# Patient Record
Sex: MALE | URBAN - METROPOLITAN AREA
[De-identification: names, ages, dates, MRNs, and addresses within clinical notes are randomized per-mention and may not be internally consistent; named-entity substitution may affect disease eponyms.]

---

## 2022-05-19 PROBLEM — H10.13 ALLERGIC CONJUNCTIVITIS, BILATERAL: Status: ACTIVE | Noted: 2022-05-19

## 2022-05-19 PROBLEM — J30.89 ALLERGIC RHINITIS DUE TO DUST MITE: Status: ACTIVE | Noted: 2022-05-19

## 2022-05-19 PROBLEM — J30.1 CHRONIC SEASONAL ALLERGIC RHINITIS DUE TO POLLEN: Status: ACTIVE | Noted: 2022-05-19

## 2022-10-11 PROBLEM — H10.13 ALLERGIC CONJUNCTIVITIS, BILATERAL: Status: RESOLVED | Noted: 2022-05-19 | Resolved: 2022-10-11

## 2023-07-25 ENCOUNTER — TELEPHONE (OUTPATIENT)
Dept: OTHER | Facility: OTHER | Age: 23
End: 2023-07-25

## 2023-09-01 ENCOUNTER — OFFICE VISIT (OUTPATIENT)
Dept: BARIATRICS | Facility: CLINIC | Age: 23
End: 2023-09-01
Payer: COMMERCIAL

## 2023-09-01 VITALS
OXYGEN SATURATION: 96 % | DIASTOLIC BLOOD PRESSURE: 86 MMHG | BODY MASS INDEX: 39.97 KG/M2 | WEIGHT: 301.6 LBS | RESPIRATION RATE: 18 BRPM | HEIGHT: 73 IN | SYSTOLIC BLOOD PRESSURE: 132 MMHG | HEART RATE: 78 BPM

## 2023-09-01 DIAGNOSIS — E66.01 MORBID (SEVERE) OBESITY DUE TO EXCESS CALORIES (HCC): ICD-10-CM

## 2023-09-01 DIAGNOSIS — E66.01 OBESITY, CLASS III, BMI 40-49.9 (MORBID OBESITY) (HCC): ICD-10-CM

## 2023-09-01 DIAGNOSIS — Z01.818 ENCOUNTER FOR OTHER PREPROCEDURAL EXAMINATION: Primary | ICD-10-CM

## 2023-09-01 PROCEDURE — 99204 OFFICE O/P NEW MOD 45 MIN: CPT | Performed by: SURGERY

## 2023-09-01 NOTE — LETTER
September 1, 2023     Adali Bhardwaj, DO  [address]  [id number]    Patient: Saqib Blackwell   YOB: 2000   Date of Visit: 9/1/2023       Dear Dr. Briseyda Arcos: Thank you for referring Saqib Blackwell to me for evaluation. Below are my notes for this consultation. If you have questions, please do not hesitate to call me. I look forward to following your patient along with you. Sincerely,        Dwain Blake MD        CC: No Recipients    Dwain Blake MD  9/1/2023 10:37 AM  Sign when Signing Visit      BARIATRIC INITIAL CONSULT - [facility] 20 y.o. male MRN: [id number]  Unit/Bed#:  Encounter: [id number]      HPI:  Saqib Blackwell is a 20 y.o. male who presents with a longstanding history of morbid obesity and inability to sustain a meaningful weight loss. He is a  in [place]. He desires to pursue metabolic and bariatric surgery to improve his health. Denies GERD. Denies tobacco. Rare NSAIDs. Denies DVT/PE. Here today to discuss bariatric options. Visit type: follow up-routine clinic     Symptoms: inability to loss weight, knee pain and back pain    Associated Symptoms: none    Associated Conditions: none  Disease Complications: none  Weight Loss Interest: high    Exercise Frequency:daily  Types of Exercise: walking      Review of Systems   Musculoskeletal: Positive for arthralgias and back pain. All other systems reviewed and are negative.       Historical Information   Past Medical History:   Diagnosis Date   • Eczema    • Nosebleed      Past Surgical History:   Procedure Laterality Date   • CAUTERIZE INNER NOSE Right 2021     Social History   Social History     Substance and Sexual Activity   Alcohol Use Yes    Comment: socially on weekends     Social History     Substance and Sexual Activity   Drug Use Not on file     Social History     Tobacco Use   Smoking Status Former   • Types: Cigarettes   • Quit date: 12/28/2020   • Years since quittin.6   Smokeless Tobacco Never     Family History: non-contributory    Meds/Allergies   all medications and allergies reviewed  No Known Allergies    Objective       Current Vitals:   /86 (BP Location: Left arm, Patient Position: Sitting, Cuff Size: Adult)   Pulse 78   Resp 18   Ht 6' 0.5" (1.842 m)   Wt (!) 137 kg (301 lb 9.6 oz)   SpO2 96%   BMI 40.34 kg/m²       Invasive Devices       None                   Physical Exam  Constitutional:       Appearance: Normal appearance. HENT:      Head: Atraumatic. Nose: No rhinorrhea. Eyes:      Extraocular Movements: Extraocular movements intact. Cardiovascular:      Rate and Rhythm: Normal rate. Pulmonary:      Effort: Pulmonary effort is normal. No respiratory distress. Abdominal:      General: Abdomen is flat. There is no distension. Musculoskeletal:         General: Normal range of motion. Cervical back: Normal range of motion. Skin:     General: Skin is warm and dry. Neurological:      General: No focal deficit present. Mental Status: He is alert and oriented to person, place, and time. Psychiatric:         Mood and Affect: Mood normal.         Behavior: Behavior normal.          Lab Results: I have personally reviewed pertinent lab results. Imaging: I have personally reviewed pertinent reports. EKG, Pathology, and Other Studies: I have personally reviewed pertinent reports. Assessment/PLAN:    20 y.o. yo female with a long standing h/o of obesity and inability to sustain any meaningful weight loss on her own despite several attempts. She is interested in the Laparoscopic sleeve gastrectomy. Patient has been counseled about the risk of developing gastroesophageal reflux disease (GERD), worsening of current GERD and/or silent reflux. Patient has also been counseled on the risk of developing Leavitt's esophagus (18%).  As a result the patient may require treatment with medications, further interventions and possibly additional surgery. Patient will require routine endoscopic surveillance to monitor for these possible complications. As a part of her pre op evaluation, she will be referred to a cardiologist and for a sleep evaluation and consult after successfully completing an evaluation with our pre-certification/, registered dietician and licensed clinical . She needs an EGD to evaluate the anatomy of her GI tract prior to the operation. I have spent over 45 minutes with her face to face in the office today discussing her options and details of the surgery. Over 50% of this was coordinating care. She was given the opportunity to ask questions and I have answered all of them. I have discussed and educated the patient with regards to the components of our multidisciplinary program and the importance of compliance and follow up in the post operative period. The patient was also instructed with regards to the importance of behavior modification, nutritional counseling, support meeting attendance and lifestyle changes that are important to ensure success. Although there is a great statistical chance of improvement or even resolution of most of her associated comorbidities, the results vary from patient to patient and they largely depend on her commitment and compliance. Weight loss goal will be determined at the time of her evaluation.     Fidelia Walsh MD  9/1/2023  10:31 AM

## 2023-09-01 NOTE — PROGRESS NOTES
BARIATRIC INITIAL CONSULT - BARIATRIC SURGERY    Chandler Jenkins 20 y.o. male MRN: [id number]  Unit/Bed#:  Encounter: [id number]      HPI:  Chandler Jenkins is a 20 y.o. male who presents with a longstanding history of morbid obesity and inability to sustain a meaningful weight loss. He is a  in [place]. He desires to pursue metabolic and bariatric surgery to improve his health. Denies GERD. Denies tobacco. Rare NSAIDs. Denies DVT/PE. Here today to discuss bariatric options. Visit type: follow up-routine clinic     Symptoms: inability to loss weight, knee pain and back pain    Associated Symptoms: none    Associated Conditions: none  Disease Complications: none  Weight Loss Interest: high    Exercise Frequency:daily  Types of Exercise: walking      Review of Systems   Musculoskeletal: Positive for arthralgias and back pain. All other systems reviewed and are negative. Historical Information   Past Medical History:   Diagnosis Date   • Eczema    • Nosebleed      Past Surgical History:   Procedure Laterality Date   • CAUTERIZE INNER NOSE Right      Social History   Social History     Substance and Sexual Activity   Alcohol Use Yes    Comment: socially on weekends     Social History     Substance and Sexual Activity   Drug Use Not on file     Social History     Tobacco Use   Smoking Status Former   • Types: Cigarettes   • Quit date: 2020   • Years since quittin.6   Smokeless Tobacco Never     Family History: non-contributory    Meds/Allergies   all medications and allergies reviewed  No Known Allergies    Objective       Current Vitals:   /86 (BP Location: Left arm, Patient Position: Sitting, Cuff Size: Adult)   Pulse 78   Resp 18   Ht 6' 0.5" (1.842 m)   Wt (!) 137 kg (301 lb 9.6 oz)   SpO2 96%   BMI 40.34 kg/m²       Invasive Devices     None                 Physical Exam  Constitutional:       Appearance: Normal appearance. HENT:      Head: Atraumatic. Nose: No rhinorrhea. Eyes:      Extraocular Movements: Extraocular movements intact. Cardiovascular:      Rate and Rhythm: Normal rate. Pulmonary:      Effort: Pulmonary effort is normal. No respiratory distress. Abdominal:      General: Abdomen is flat. There is no distension. Musculoskeletal:         General: Normal range of motion. Cervical back: Normal range of motion. Skin:     General: Skin is warm and dry. Neurological:      General: No focal deficit present. Mental Status: He is alert and oriented to person, place, and time. Psychiatric:         Mood and Affect: Mood normal.         Behavior: Behavior normal.          Lab Results: I have personally reviewed pertinent lab results. Imaging: I have personally reviewed pertinent reports. EKG, Pathology, and Other Studies: I have personally reviewed pertinent reports. Assessment/PLAN:    20 y.o. yo female with a long standing h/o of obesity and inability to sustain any meaningful weight loss on her own despite several attempts. She is interested in the Laparoscopic sleeve gastrectomy. Patient has been counseled about the risk of developing gastroesophageal reflux disease (GERD), worsening of current GERD and/or silent reflux. Patient has also been counseled on the risk of developing Leavitt's esophagus (18%). As a result the patient may require treatment with medications, further interventions and possibly additional surgery. Patient will require routine endoscopic surveillance to monitor for these possible complications. As a part of her pre op evaluation, she will be referred to a cardiologist and for a sleep evaluation and consult after successfully completing an evaluation with our pre-certification/, registered dietician and licensed clinical . She needs an EGD to evaluate the anatomy of her GI tract prior to the operation.   I have spent over 45 minutes with her face to face in the office today discussing her options and details of the surgery. Over 50% of this was coordinating care. She was given the opportunity to ask questions and I have answered all of them. I have discussed and educated the patient with regards to the components of our multidisciplinary program and the importance of compliance and follow up in the post operative period. The patient was also instructed with regards to the importance of behavior modification, nutritional counseling, support meeting attendance and lifestyle changes that are important to ensure success. Although there is a great statistical chance of improvement or even resolution of most of her associated comorbidities, the results vary from patient to patient and they largely depend on her commitment and compliance. Weight loss goal will be determined at the time of her evaluation.     Samy Hyman MD  9/1/2023  10:31 AM

## 2023-10-05 NOTE — PROGRESS NOTES
Bariatric Behavioral Health Evaluation    Presenting Problem: 20year old male ( 2000) here for behavioral health evaluation. Patient had initial consult with Dr. Julian Mcpherson 23. Patient is wanting a lifestyle change and wants to prevent future health issues. Is the patient seeking Bariatric Surgery Eval? Yes  If yes how long have you researched this surgery option. Patient recently started looking into bariatric surgery after seeing his friend go through the process. Realizes Post- Op Requirements? Yes, but would benefit from more education. Pre-morbid level of function and history of present illness: patient reports struggling with his weight for about 5 years. Patient attributes this to being less active and also a break up with a girlfriend. Psychiatric/Psychological Treatment Diagnosis: Diagnosed with ADHD, prescribed medication by his PCP, but reports that he feels that the medication increases symptoms of anxiety. Patient will reach out to his PCP. Patient educated on the benefits of outpatient therapy to develop positive coping skills and habits for success long term, resource list provided. Outpatient Counselor No     Psychiatrist No     Have you had Inpatient Treatment? No    Family Constellation: Patient currently lives with his dad and his 22year old sister. His mom lives in [place]. Additional comments/stressors related to family/relationships/peer support: Patient identifies his mom and his sister as his support. Patient identifies work as current stressors.     Physical/Psychological Assessment:     Appearance: appropriate  Sociability: friendly  Affect: appropriate  Mood: calm  Thought Process: coherent  Speech: normal  Content: no impairment  Orientation: person  Yes , place  Yes , time  Yes , normal attention span  Yes , normal memory  Yes   and normal judgement  Yes   Insight: emotional  good    Risk Assessment:     none    Recommendations: Recommended for surgery  yes    Risk of Harm to Self or Others: Patient denies SI or HI     Observation:     Interviews: This interview only. Based on the previous information, the client presents the following risk of harm to self or others: low     Note: Patient here for behavioral health evaluation. Diagnosed with ADHD, prescribed medication by his PCP, but reports that he feels that the medication increases symptoms of anxiety. Patient will reach out to his PCP. Patient educated on the benefits of outpatient therapy to develop positive coping skills and habits for success long term, resource list provided. Patient denies any current substance abuse. Former smoker, quit 2.5 years ago. Currently vaping, working on quitting. Patient must quit by 12/10/23 and test by 1/10/23. Alcohol use 2x per week. Patient educated on the impact of nicotine and alcohol on the post bariatric patient. Patient agrees to abstain from all substances prior to as well as after surgery. Patient meets criteria for surgery at this program and will follow up with RD this month. Patient's insurance will require 6 months of weight checks. Patient will also need to schedule an EGD following the visit today- may schedule with GI if he decides to do a colonoscopy at this time as well. Goals discussed:   1. vaping cessation- quit by 12/10 and test by 1/10/24  2. Be mindful not to skip meals  3. Increase water intake  4. increase activity on slower days at work  5. Continue to be mindful of emotional eating  6.  Sleep consult- needs OMER dx to qualify  Bret Hull LCSW

## 2023-10-10 ENCOUNTER — CLINICAL SUPPORT (OUTPATIENT)
Dept: BARIATRICS | Facility: CLINIC | Age: 23
End: 2023-10-10

## 2023-10-10 VITALS
WEIGHT: 296.6 LBS | RESPIRATION RATE: 16 BRPM | HEART RATE: 77 BPM | HEIGHT: 73 IN | SYSTOLIC BLOOD PRESSURE: 148 MMHG | BODY MASS INDEX: 39.31 KG/M2 | DIASTOLIC BLOOD PRESSURE: 88 MMHG

## 2023-10-10 DIAGNOSIS — Z98.84 BARIATRIC SURGERY STATUS: Primary | ICD-10-CM

## 2023-10-10 DIAGNOSIS — E66.9 OBESITY, UNSPECIFIED: Primary | ICD-10-CM

## 2023-10-10 DIAGNOSIS — Z71.89 ENCOUNTER FOR PRE-BARIATRIC SURGERY COUNSELING AND EDUCATION: ICD-10-CM

## 2023-10-10 PROCEDURE — RECHECK

## 2023-10-10 NOTE — PROGRESS NOTES
Bariatric Nutrition Assessment Note - Evaluation    Insurance: 6 required monthly weight checks      Type of surgery    Interested in thrrVertical sleeve gastrectomy  Surgery Date: TBD  Surgeon: Consult with Dr. Bushra Rush 9/1/2023      Nutrition Assessment   Stone Hyde  20 y.o.  male     Resp 16   Ht 6' 0.5" (1.842 m)   Wt 135 kg (296 lb 9.6 oz)   BMI 39.67 kg/m²      Height: 6'0.5"  Eval Weight: 296.6#   BMI: 39.7  Wt with BMI of 25: 187#  Pre-Op Excess Wt: 109.4#  BMI to Qualify at 40 = 299#  PMH includes: Obesity, SB=4/8     Pt advised not to gain weight during preop process. Pt encouraged to lose weight via healthy eating and exercise. Pt may follow Liver Shrinking diet 2 weeks prior to DOS depending on BMI at time. This diet will promote weight loss.     [address] Equation:   Estimated calories to maintain weightt: 2900   Estimated calories for weight loss 5337-0996 ( 1-2# per wk wt loss - sedentary )  Estimated protein needs  grams (1.0-1.5 gms/kg BMI at 25 )   Estimated fluid needs 85-99 oz (30-35 ml/kg BMI at 25 )      Weight History Reason for WLS: AT current weight X 5 years and taking toll on knees and feet - cannot snowboard and ice skate Onset of Obesity: Adult  Mom had bypass  Family history of obesity: Yes  Wt Loss Attempts: Exercise  Fasting  High Protein/Low CHO diets (Keto)  Self Created Diets (Portion Control, Healthy Food Choices, etc.)  Patient has tried the above for 6 months or more with insufficient weight loss or weight regain, which is why patient has requested to be evaluated for weight loss surgery today  Maximum Wt Lost: 25-30#       Review of History and Medications   OTC: Gummies   Past Medical History:   Diagnosis Date   • Eczema    • Nosebleed      Past Surgical History:   Procedure Laterality Date   • CAUTERIZE INNER NOSE Right 2021     Social History     Socioeconomic History   • Marital status: Single     Spouse name: None   • Number of children: None   • Years of education: None   • Highest education level: None   Occupational History   • None   Tobacco Use   • Smoking status: Former     Types: Cigarettes     Quit date: 2020     Years since quittin.7   • Smokeless tobacco: Never   Vaping Use   • Vaping Use: Every day   • Substances: Nicotine   Substance and Sexual Activity   • Alcohol use: Yes     Comment: socially on weekends   • Drug use: None   • Sexual activity: None   Other Topics Concern   • None   Social History Narrative    Who lives in your home: father    What type of home do you live in: Single house    Age of your home: 48 yrs    How long have you been living there: 13 yrs    Type of heat: Baseboard and Wood stove    Type of fuel: Oil and Wood    What type of gabe is in your bedroom: Hardwood floor    Do you have the following in or near your home:    Air products: Window air conditioning    Pests: None    Pets: dogs and cats when he visits his moms but not in his home     Are pets allowed in bedroom: N/A    Open fields, wooded areas nearby: Open fields and wooded areas     Basement: Damp    Exposure to second hand smoke: No        Habits:    Caffein:     Chocolate:      Other:          Social Determinants of Health     Financial Resource Strain: Not on file   Food Insecurity: Not on file   Transportation Needs: Not on file   Physical Activity: Not on file   Stress: Not on file   Social Connections: Not on file   Intimate Partner Violence: Not on file   Housing Stability: Not on file       Current Outpatient Medications:   •  amphetamine-dextroamphetamine (ADDERALL XR) 20 MG 24 hr capsule, Take 20 mg by mouth daily, Disp: , Rfl:   •  montelukast (SINGULAIR) 10 mg tablet, Take 1 tablet (10 mg total) by mouth daily at bedtime, Disp: 30 tablet, Rfl: 5  Food Intake and Lifestyle Assessment  [facility]  Food Intake Assessment completed via usual diet recall  Breakfast: Coffee (rarely) 9:30 SW, Water   Dinner: 6-8 pm  Fatty meats, stews, vegetables, reduced portion   Snacks protein bar, maybe chips  Beverage intake:  Water, Zero sugar beverages Alcohol on W/E - few beers  Protein supplement: Bars - Nilke  Portions: 6 oz Protein  1/2-1 c Starch   1-2c Vegetable    Estimated protein intake per day:   Estimated fluid intake per day: 6-8 bottles in summer  4-6 bottles per day  Meals eaten away from home: Decreased Fast   Typical meal pattern: 2 meals per day and 0-1 snacks per day  Eating Behaviors: Consumption of high calorie/ high fat foods, Large portion sizes and Emotional eating but since developed positive coping skilss  Food allergies or intolerances: No Known Allergies - Lactose intolerance  Cultural or Presybeterian considerations: None    Physical Assessment  Physical Activity Job is very active  Types of exercise: Plans to return to   Current physical limitations: pain    Psychosocial Assessment   Support systems: parent(s) friend(s)  Socioeconomic factors:  in [facility]    Nutrition Diagnosis  Diagnosis: Overweight / Obesity (NC-3.3)  Related to: Physical inactivity and Excessive energy intake  As Evidenced by: BMI >25     Nutrition Prescription: Recommend the following diet  Low fat, Low sugar, High protein and Regular    Interventions and Teaching   Discussed pre-op and post-op nutrition guidelines. Patient educated and handouts provided.   Surgical changes to stomach / GI  Capacity of post-surgery stomach  Diet progression  Adequate hydration  Sugar and fat restriction to decrease "dumping syndrome"  Fat restriction to decrease steatorrhea  Expected weight loss  Weight loss plateaus/ possibility of weight regain  Exercise  Suggestions for pre-op diet  Nutrition considerations after surgery  Protein supplements  Meal planning and preparation  Appropriate carbohydrate, protein, and fat intake, and food/fluid choices to maximize safe weight loss, nutrient intake, and tolerance   Dietary and lifestyle changes  Possible problems with poor eating habits  Intuitive eating  Techniques for self monitoring and keeping daily food journal  Potential for food intolerance after surgery, and ways to deal with them including: lactose intolerance, nausea, reflux, vomiting, diarrhea, food intolerance, appetite changes, gas  Vitamin / Mineral supplementation of Multivitamin with minerals, Calcium, Vitamin B12, Iron, Fat Soluble vitamins and Vitamin D    Patient is not currently pregnant and doesn't desire to become pregnant a minimum of one year post-op    Education provided to: patient    Barriers to learning: No barriers identified    Readiness to change: preparation    Prior research on procedure: internet, discussed with provider and friends or family    Comprehension: verbalizes understanding     Expected Compliance: good  Recommendations  Pt is an appropriate candidate for surgery.  Yes    Evaluation / Monitoring  Dietitian to Monitor: Eating pattern as discussed Tolerance of nutrition prescription Body weight Lab values Physical activity Bowel pattern    Goals  Eliminate sugar sweetened beverages, Food journal, Exercise 30 minutes 5 times per week, Complete lession plans 1-6, Eat 3 meals per day and Eliminate mindless snacking   Follow Pre-Surgery guidelines  > Trial Baritastic for food logging  > Establish regular meal pattern - include fruits, vegetables and whole grains  > Avoid skipping meals- Can use protein drink or bar as meal replacement  > Decrease portions  > Focus on protein - include lean protein at each meal and snack - Learn to eat protein first  > Continue to imit processed foods, fast foods and dining away from home  > Include meal prepping  > Limit snacks - healthier choices and portion; avoid grazing  > Slow pace of eating and sip fluids  - practice 30/60 minute rule  > Reduce caffeine/ eliminate by day of surgery  > Reduce/eliminate carbonation by pre-op diet  > Maintain water intake >  - 64 oz  > Maintain ncrease physical activity -return to gym   > Start multi vitamin and additional Vitamin D 2000IU  Work on skills to cope with emotional eating/mindfull eating  Pre-op weight loss not required but advised not to gain weight  F/U next month with bariatric provider      Time Spent:   1 Hour

## 2023-10-19 ENCOUNTER — OFFICE VISIT (OUTPATIENT)
Dept: BARIATRICS | Facility: CLINIC | Age: 23
End: 2023-10-19

## 2023-10-19 VITALS — WEIGHT: 302.8 LBS | BODY MASS INDEX: 40.5 KG/M2

## 2023-10-19 DIAGNOSIS — Z01.818 PREOPERATIVE TESTING: ICD-10-CM

## 2023-10-19 DIAGNOSIS — E66.9 OBESITY, CLASS II, BMI 35-39.9: ICD-10-CM

## 2023-10-19 DIAGNOSIS — F17.200 NICOTINE DEPENDENCE: ICD-10-CM

## 2023-10-19 PROCEDURE — RECHECK

## 2023-10-19 NOTE — PROGRESS NOTES
Bariatric Nutrition Assessment Note -    Insurance: 6 required monthly weight checks 2/6 today      Type of surgery    Interested in thrrVertical sleeve gastrectomy  Surgery Date: TBD  Surgeon: Consult with Dr. Natanael Matamoros 9/1/2023      Nutrition Assessment   Stone Hyde  20 y.o.  male     There were no vitals taken for this visit. Height: 6'0.5"  Weight: 302.8#  Eval Weight: 296.6#   BMI: 39.7  Wt with BMI of 25: 187#  Pre-Op Excess Wt: 109.4#  BMI to Qualify at 40 = 299#  PMH includes: Obesity, SB=4/8     Pt advised not to gain weight during preop process. Pt encouraged to lose weight via healthy eating and exercise. Pt may follow Liver Shrinking diet 2 weeks prior to DOS depending on BMI at time. This diet will promote weight loss.     [address] [facility] Equation:   Estimated calories to maintain weightt: 2900   Estimated calories for weight loss 1538-2737 ( 1-2# per wk wt loss - sedentary )  Estimated protein needs  grams (1.0-1.5 gms/kg BMI at 25 )   Estimated fluid needs 85-99 oz (30-35 ml/kg BMI at 25 )      Weight History Reason for WLS: AT current weight X 5 years and taking toll on knees and feet - cannot snowboard and ice skate Onset of Obesity: Adult  Mom had bypass  Family history of obesity: Yes  Wt Loss Attempts: Exercise  Fasting  High Protein/Low CHO diets (Keto)  Self Created Diets (Portion Control, Healthy Food Choices, etc.)  Patient has tried the above for 6 months or more with insufficient weight loss or weight regain, which is why patient has requested to be evaluated for weight loss surgery today  Maximum Wt Lost: 25-30#       Review of History and Medications   OTC: Gummies   Past Medical History:   Diagnosis Date    Eczema     Nosebleed      Past Surgical History:   Procedure Laterality Date    CAUTERIZE INNER NOSE Right 2021     Social History     Socioeconomic History    Marital status: Single     Spouse name: Not on file    Number of children: Not on file    Years of education: Not on file    Highest education level: Not on file   Occupational History    Not on file   Tobacco Use    Smoking status: Former     Types: Cigarettes     Quit date: 2020     Years since quittin.8    Smokeless tobacco: Never   Vaping Use    Vaping Use: Every day    Substances: Nicotine   Substance and Sexual Activity    Alcohol use: Yes     Comment: socially on weekends    Drug use: Not on file    Sexual activity: Not on file   Other Topics Concern    Not on file   Social History Narrative    Who lives in your home: father    What type of home do you live in: Single house    Age of your home: 48 yrs    How long have you been living there: 13 yrs    Type of heat: Baseboard and Wood stove    Type of fuel: Oil and Wood    What type of gabe is in your bedroom: Hardwood floor    Do you have the following in or near your home:    Air products: Window air conditioning    Pests: None    Pets: dogs and cats when he visits his moms but not in his home     Are pets allowed in bedroom: N/A    Open fields, wooded areas nearby: Open fields and wooded areas     Basement: Damp    Exposure to second hand smoke: No        Habits:    Caffein:     Chocolate:      Other:          Social Determinants of Health     Financial Resource Strain: Not on file   Food Insecurity: Not on file   Transportation Needs: Not on file   Physical Activity: Not on file   Stress: Not on file   Social Connections: Not on file   Intimate Partner Violence: Not on file   Housing Stability: Not on file       Current Outpatient Medications:     amphetamine-dextroamphetamine (ADDERALL XR) 20 MG 24 hr capsule, Take 20 mg by mouth daily, Disp: , Rfl:     montelukast (SINGULAIR) 10 mg tablet, Take 1 tablet (10 mg total) by mouth daily at bedtime, Disp: 30 tablet, Rfl: 5  Food Intake and Lifestyle Assessment  [facility]  Food Intake Assessment completed via usual diet recall  Breakfast: Coffee (rarely) 9:30 SW, Water   Dinner: 6-8 pm  Fatty meats, stews, vegetables, reduced portion   Snacks protein bar, maybe chips  Beverage intake:  Water, Zero sugar beverages Alcohol on W/E - few beers  Protein supplement: Bars - Nilke  Portions: 6 oz Protein  1/2-1 c Starch   1-2c Vegetable    Estimated protein intake per day:   Estimated fluid intake per day: 6-8 bottles in summer  4-6 bottles per day  Meals eaten away from home: Decreased Fast   Typical meal pattern: 2 meals per day and 0-1 snacks per day  Eating Behaviors: Consumption of high calorie/ high fat foods, Large portion sizes and Emotional eating but since developed positive coping skilss  Food allergies or intolerances: No Known Allergies - Lactose intolerance  Cultural or Sabianism considerations: None    Physical Assessment  Physical Activity Job is very active  Types of exercise: Plans to return to   Current physical limitations: pain    Psychosocial Assessment   Support systems: parent(s) friend(s)  Socioeconomic factors:  in [facility]    Nutrition Diagnosis  Diagnosis: Overweight / Obesity (NC-3.3)  Related to: Physical inactivity and Excessive energy intake  As Evidenced by: BMI >25     Nutrition Prescription: Recommend the following diet  Low fat, Low sugar, High protein and Regular    Interventions and Teaching   Discussed pre-op and post-op nutrition guidelines. Patient educated and handouts provided.   Surgical changes to stomach / GI  Capacity of post-surgery stomach  Diet progression  Adequate hydration  Sugar and fat restriction to decrease "dumping syndrome"  Fat restriction to decrease steatorrhea  Expected weight loss  Weight loss plateaus/ possibility of weight regain  Exercise  Suggestions for pre-op diet  Nutrition considerations after surgery  Protein supplements  Meal planning and preparation  Appropriate carbohydrate, protein, and fat intake, and food/fluid choices to maximize safe weight loss, nutrient intake, and tolerance   Dietary and lifestyle changes  Possible problems with poor eating habits  Intuitive eating  Techniques for self monitoring and keeping daily food journal  Potential for food intolerance after surgery, and ways to deal with them including: lactose intolerance, nausea, reflux, vomiting, diarrhea, food intolerance, appetite changes, gas  Vitamin / Mineral supplementation of Multivitamin with minerals, Calcium, Vitamin B12, Iron, Fat Soluble vitamins and Vitamin D    Patient is not currently pregnant and doesn't desire to become pregnant a minimum of one year post-op    Education provided to: patient    Barriers to learning: No barriers identified    Readiness to change: preparation    Prior research on procedure: internet, discussed with provider and friends or family    Comprehension: verbalizes understanding     Expected Compliance: good  Recommendations  Pt is an appropriate candidate for surgery. Yes    Evaluation / Monitoring  Dietitian to Monitor: Eating pattern as discussed Tolerance of nutrition prescription Body weight Lab values Physical activity Bowel pattern    2/6 Weight Check Visit Summary 10/19/2023  Just started preop process. Pt working on requirements for program. Has stopped vaping since eval last week. Since has been eating sunflower seeds to help replace habit. Also to start using pre prepped meals "Factor" as a healthy option for meals as works long hours with travel. Questions/concerns addressed during session. Workflow reviewed and updated,  To to call to schedule November appt as trying to schedule other appts as per work schedule. [facility]  Psych and/or D+A Clearance: N/A  Blood Work: To complete  PCP letter: To obtain  Surgeon Appt: 9/1/2023  EGD: To schedule with colonoscopy  Cardiac Risk Assessment with ECG To schedule  Sleep Studies:  To scheduled  Nicotine test:  Stopped vaping   Pre-Operative Program: 2/6       Goals  Eliminate sugar sweetened beverages, Food journal, Exercise 30 minutes 5 times per week, Complete lession plans 1-6, Eat 3 meals per day and Eliminate mindless snacking   Follow Pre-Surgery guidelines  > Trial Baritastic for food logging  > Establish regular meal pattern - include fruits, vegetables and whole grains  > Avoid skipping meals- Can use protein drink or bar as meal replacement  > Decrease portions  > Focus on protein - include lean protein at each meal and snack - Learn to eat protein first  > Continue to imit processed foods, fast foods and dining away from home  > Include meal prepping  > Limit snacks - healthier choices and portion; avoid grazing  > Slow pace of eating and sip fluids  - practice 30/60 minute rule  > Reduce caffeine/ eliminate by day of surgery  > Reduce/eliminate carbonation by pre-op diet  > Maintain water intake >  - 64 oz  > Maintain ncrease physical activity -return to gym   > Start multi vitamin and additional Vitamin D 2000IU  Work on skills to cope with emotional eating/mindfull eating  Pre-op weight loss not required but advised not to gain weight  F/U next month with bariatric provider      Time Spent:   30 minutes

## 2023-11-21 NOTE — PROGRESS NOTES
3/6 weight check. Has been making healthier choices. Has access to a microwave at work and has been prepping meals for the week on the weekends. Still skipping lunch lunch. Stopped vaping about 2 weeks ago. Drinking 3-4 bottles of water, 20 oz zero sugar iced tea. Trauma history includes his grandfather passing away in 2013- he passed suddenly of colon cancer within a month of being diagnosed. Workflow reviewed:   Psych and/or D+A Clearance: N/A  **PCP Letter: Needs- plans to schedule physical  Support Group: No longer required, strongly encouraged  Surgeon Appt: 9/1/23  **EGD: Schedule with GI with colonoscopy  **Cardiac Risk Assessment: consult scheduled 3/4/24- move to January  **Sleep Studies: consult completed 11/22/23, sleep study scheduled 12/29  **Blood work: Needs to complete  **Nicotine test: vape- needs to quit by 12/10/23 and test by 1/10/24  Required weight checks: 3/6 today  Weight Loss: Not required, encouraged positive lifestyle changes.   Asher Diaz LCSW

## 2023-11-22 ENCOUNTER — OFFICE VISIT (OUTPATIENT)
Dept: BARIATRICS | Facility: CLINIC | Age: 23
End: 2023-11-22

## 2023-11-22 ENCOUNTER — OFFICE VISIT (OUTPATIENT)
Dept: SLEEP CENTER | Facility: CLINIC | Age: 23
End: 2023-11-22
Payer: COMMERCIAL

## 2023-11-22 VITALS
BODY MASS INDEX: 41.31 KG/M2 | HEART RATE: 78 BPM | SYSTOLIC BLOOD PRESSURE: 132 MMHG | OXYGEN SATURATION: 98 % | HEIGHT: 72 IN | RESPIRATION RATE: 16 BRPM | WEIGHT: 305 LBS | DIASTOLIC BLOOD PRESSURE: 80 MMHG

## 2023-11-22 VITALS — BODY MASS INDEX: 40.96 KG/M2 | WEIGHT: 302 LBS

## 2023-11-22 DIAGNOSIS — E66.01 MORBID OBESITY (HCC): ICD-10-CM

## 2023-11-22 DIAGNOSIS — F90.9 ATTENTION DEFICIT HYPERACTIVITY DISORDER (ADHD), UNSPECIFIED ADHD TYPE: ICD-10-CM

## 2023-11-22 DIAGNOSIS — Z71.89 ENCOUNTER FOR PRE-BARIATRIC SURGERY COUNSELING AND EDUCATION: Primary | ICD-10-CM

## 2023-11-22 DIAGNOSIS — J30.1 CHRONIC SEASONAL ALLERGIC RHINITIS DUE TO POLLEN: ICD-10-CM

## 2023-11-22 DIAGNOSIS — Z98.84 BARIATRIC SURGERY STATUS: ICD-10-CM

## 2023-11-22 DIAGNOSIS — R06.83 SNORING: Primary | ICD-10-CM

## 2023-11-22 PROCEDURE — RECHECK

## 2023-11-22 PROCEDURE — 99204 OFFICE O/P NEW MOD 45 MIN: CPT | Performed by: INTERNAL MEDICINE

## 2023-11-22 NOTE — PATIENT INSTRUCTIONS
What is OMER? Obstructive sleep apnea is a common and serious sleep disorder that causes you to stop breathing during sleep. The airway repeatedly becomes blocked, limiting the amount of air that reaches your lungs. When this happens, you may snore loudly or making choking noises as you try to breathe. Your brain and body becomes oxygen deprived and you may wake up. This may happen a few times a night, or in more severe cases, several hundred times a night. Sleep apnea can make you wake up in the morning feeling tired or unrefreshed even though you have had a full night of sleep. During the day, you may feel fatigued, have difficulty concentrating or you may even unintentionally fall asleep. This is because your body is waking up numerous times throughout the night, even though you might not be conscious of each awakening. The lack of oxygen your body receives can have negative long-term consequences for your health. This includes:  High blood pressure  Heart disease  Irregular heart rhythms  Stroke  Pre-diabetes and diabetes  Depression  Testing  An objective evaluation of your sleep may be needed before your board certified sleep physician can make a diagnosis. Options include:   In-lab overnight sleep study  This type of sleep study requires you to stay overnight at a sleep center, in a bed that may resemble a hotel room. You will sleep with sensors hooked up to various parts of your body. These sensors record your brain waves, heartbeat, breathing and movement. An overnight sleep study also provides your doctor with the most complete information about your sleep. Learn more about an overnight sleep study. Home sleep apnea test  Some patients with high risk factors for obstructive sleep apnea and no other medical disorders may be candidates for a home sleep apnea test. The testing equipment differs in that it is less complicated than what is used in an overnight sleep study.  As such, does not give all the data an in-lab will and if negative, may not mean you do not have the problem. Treatment for sleep apnea includes using a continuous positive airway pressure (CPAP) machine to keep your airway open during sleep. A mask is placed over your nose and mouth, or just your nose. The mask is hooked to the CPAP machine that blows a gentle stream of air into the mask when you breathe. This helps keep your airway open so you can breathe more regularly. Extra oxygen may be given to you through the machine. You may be given a mouth device. It looks like a mouth guard or dental retainer and stops your tongue and mouth tissues from blocking your throat while you sleep. Surgery may be needed to remove extra tissues that block your mouth, throat, or nose. Manage sleep apnea:   Do not smoke. Nicotine and other chemicals in cigarettes and cigars can cause lung damage. Ask your healthcare provider for information if you currently smoke and need help to quit. E-cigarettes or smokeless tobacco still contain nicotine. Talk to your healthcare provider before you use these products. Do not drink alcohol or take sedative medicine before you go to sleep. Alcohol and sedatives can relax the muscles and tissues around your throat. This can block the airflow to your lungs. Maintain a healthy weight. Excess tissue around your throat may restrict your breathing. Ask your healthcare provider for information if you need to lose weight. Sleep on your side or use pillows designed to prevent sleep apnea. This prevents your tongue or other tissues from blocking your throat. You can also raise the head of your bed. Driving Safety. Refrain from driving when drowsy. Follow up with your healthcare provider as directed: Write down your questions so you remember to ask them during your visits. Go to AASM website for more information: Sleepeducation. org  What is OMER?    Obstructive sleep apnea is a common and serious sleep disorder that causes you to stop breathing during sleep. The airway repeatedly becomes blocked, limiting the amount of air that reaches your lungs. When this happens, you may snore loudly or making choking noises as you try to breathe. Your brain and body becomes oxygen deprived and you may wake up. This may happen a few times a night, or in more severe cases, several hundred times a night. Sleep apnea can make you wake up in the morning feeling tired or unrefreshed even though you have had a full night of sleep. During the day, you may feel fatigued, have difficulty concentrating or you may even unintentionally fall asleep. This is because your body is waking up numerous times throughout the night, even though you might not be conscious of each awakening. The lack of oxygen your body receives can have negative long-term consequences for your health. This includes:  High blood pressure  Heart disease  Irregular heart rhythms  Stroke  Pre-diabetes and diabetes  Depression  Testing  An objective evaluation of your sleep may be needed before your board certified sleep physician can make a diagnosis. Options include:   In-lab overnight sleep study  This type of sleep study requires you to stay overnight at a sleep center, in a bed that may resemble a hotel room. You will sleep with sensors hooked up to various parts of your body. These sensors record your brain waves, heartbeat, breathing and movement. An overnight sleep study also provides your doctor with the most complete information about your sleep. Learn more about an overnight sleep study. Home sleep apnea test  Some patients with high risk factors for obstructive sleep apnea and no other medical disorders may be candidates for a home sleep apnea test. The testing equipment differs in that it is less complicated than what is used in an overnight sleep study. As such, does not give all the data an in-lab will and if negative, may not mean you do not have the problem.   Treatment for sleep apnea includes using a continuous positive airway pressure (CPAP) machine to keep your airway open during sleep. A mask is placed over your nose and mouth, or just your nose. The mask is hooked to the CPAP machine that blows a gentle stream of air into the mask when you breathe. This helps keep your airway open so you can breathe more regularly. Extra oxygen may be given to you through the machine. You may be given a mouth device. It looks like a mouth guard or dental retainer and stops your tongue and mouth tissues from blocking your throat while you sleep. Surgery may be needed to remove extra tissues that block your mouth, throat, or nose. Manage sleep apnea:   Do not smoke. Nicotine and other chemicals in cigarettes and cigars can cause lung damage. Ask your healthcare provider for information if you currently smoke and need help to quit. E-cigarettes or smokeless tobacco still contain nicotine. Talk to your healthcare provider before you use these products. Do not drink alcohol or take sedative medicine before you go to sleep. Alcohol and sedatives can relax the muscles and tissues around your throat. This can block the airflow to your lungs. Maintain a healthy weight. Excess tissue around your throat may restrict your breathing. Ask your healthcare provider for information if you need to lose weight. Sleep on your side or use pillows designed to prevent sleep apnea. This prevents your tongue or other tissues from blocking your throat. You can also raise the head of your bed. Driving Safety. Refrain from driving when drowsy. Follow up with your healthcare provider as directed: Write down your questions so you remember to ask them during your visits. Go to AASM website for more information: Sleepeducation. org    Nursing Support:  When: Monday through Friday 7A-5PM except holidays  Where: Our direct line is [phone]. If you are having a true emergency please call 911.   In the event that the line is busy or it is after hours please leave a voice message and we will return your call. Please speak clearly, leaving your full name, birth date, best number to reach you and the reason for your call. Medication refills: We will need the name of the medication, the dosage, the ordering provider, whether you get a 30 or 90 day refill, and the pharmacy name and address. Medications will be ordered by the provider only. Nurses cannot call in prescriptions. Please allow 7 days for medication refills. Physician requested updates: If your provider requested that you call with an update after starting medication, please be ready to provide us the medication and dosage, what time you take your medication, the time you attempt to fall asleep, time you fall asleep, when you wake up, and what time you get out of bed. Sleep Study Results: We will contact you with sleep study results and/or next steps after the physician has reviewed your testing.

## 2023-11-22 NOTE — PROGRESS NOTES
Consultation - Sleep Center   Julissa Caban  20 y.o. male  :2000  PER:[id number]  DOS:2023    Physician Requesting Consult: Chip Silverio MD             Reason for Consult : At your kind request I saw Julissa Caban for initial sleep evaluation today. The patient is planning Bariatric surgery and is here to also evaluate for Obstructive Sleep Apnea. Patient's presents with:. PFSH, Problem List, Medications & Allergies were reviewed in EMR. Hernán Monet  has a past medical history of Eczema and Nosebleed. He has a current medication list which includes the following prescription(s): amphetamine-dextroamphetamine and montelukast.      HPI: He sleeps alone but family reports snoring of several years duration. Stone is un aware of snoring or breathing difficulties during sleep. Other complaints: None. Restless Leg Syndrome: reports no suggestive symptoms. Parasomnia: no features reported    Sleep Routine (averaged): Typical Bedtime: 10 PM and.  Gets OOB: 5 AM. TIB:7 hrs. Sleep latency:<  30 minutes Sleep Interruptions: Infrequent,. Awakens: Needing an alarm  Upon awakening: feels refreshed. He estimates getting hrs sleep. Daytime Function:Stone denies excessive daytime sleepiness. He rated himself at Total score: 0 /24 on the [place] Sleepiness Scale. Habits:   reports that he quit smoking about 2 years ago. His smoking use included cigarettes. He has never used smokeless tobacco.;  reports current alcohol use.; Has no history on file for drug use.;  E-Cigarette/Vaping  Every Day User - but stopped a month ago   Caffeine use:very little; Exercise routine: none but is physically active in his job. .    Occupation:     Family History: Negative for sleep disturbance. ROS: Significant for weight fluctuates in the range of around 20 pounds. He has nasal symptoms due to seasonal allergies. He is reporting no respiratory or cardiac symptoms. He is on Adderall for ADHD. [facility]      EXAM:  /80 Pulse 78   Resp 16   Ht 6' (1.829 m)   Wt (!) 138 kg (305 lb)   SpO2 98%   BMI 41.37 kg/m²    General: Well groomed male, well appearing, in no apparent distress. Neurological: Alert and orientated; cooperative; Cranial nerves intact;    Psychiatric: Speech: Clear and coherent; normal mood, affect & thought   Skin: Warm and dry; Color& Hydration good; no facial rashes or lesions   HEENT:  Craniofacial anatomy: normal Sinuses: Non-tender. TMJ: Normal    Eyes: EOM's intact; conjunctiva/corneas clear   Ears: Appear normal     Nasal Airway: is patent Septum: Intact; Turbinates: Normal; Rhinorrhea: None  Mouth: Lips: Normal posture; Dentition: normal . Mucosa: Moist; Hard Palate:normal    Oropharryx: crowded and AP narrowing Tongue: Mallampati:Class IV, Mobile, and ScallopedSoft Palate:  redundant  Tonsils: present & normal  Neck: Is thick and excess fatty tissue; neck Circumference: 18 "; supple; no abnormal masses; Thyroid: Normal. Trachea: Central.    Lymph: No cervical or submandibular Lymhadenopathy  Heart: S1,S2 normal; RRR; no gallop; no murmur   Lungs: Respiratory Effort: Normal. Air entry good bilaterally. No wheezes. No rales  Abdomen: Obese, soft & non-tender    Extremities: No pedal edema. No clubbing or cyanosis. Musculoskeletal:  Motor normal; Gait: Normal.       There are no labs for review. IMPRESSION: Primary/Secondary Sleep Diagnoses (to Medical or Psych conditions) & Comorbidities   1. Snoring  Home Study      2. Chronic seasonal allergic rhinitis due to pollen        3. Attention deficit hyperactivity disorder (ADHD), unspecified ADHD type  Home Study      4. Morbid obesity ([address] [facility])  Home Study      5. Bariatric surgery status  Ambulatory referral to Sleep Medicine           PLAN:   1.  With respect to above conditions, comprehensive counseling provided on pathophysiology; effects on symptoms and comorbidities, diagnostic strategies & limitations; treatment options; risks or no treament; risks & benefits of the various therapeutic options; costs and insurance aspects. 2. I advised weight reduction, avoiding sleeping supine, using alcohol or sedating medications close to bed time and on safe driving practices. 3. Sleep testing is indicated and a diagnostic study will be scheduled. 4.  Patient is willing to try Positive airway pressure therapy and will be scheduled for a titration study if indicated. 5. Follow-up to be scheduled after testing initiation of therapy to review results, further details of treatment options and to adjust therapy. Sincerely,      Authenticated electronically on 48/22/91   Board Certified Specialist     Portions of the record may have been created with voice recognition software. Occasional wrong word or "sound a like" substitutions may have occurred due to the inherent limitations of voice recognition software. There may also be notations and random deletions of words or characters from malfunctioning software. Read the chart carefully and recognize, using context, where substitutions/deletions have occurred.

## 2023-12-28 NOTE — PROGRESS NOTES
"Bariatric Nutrition Assessment Note -    Insurance: 6 required monthly weight checks 2/6 today      Type of surgery    Interested in thrrVertical sleeve gastrectomy  Surgery Date: TBD  Surgeon: Consult with Dr. Sharifa reyes 9/1/2023      Nutrition Assessment   Stone Hyde  23 y.o.  male     There were no vitals taken for this visit.     Height: 6'0.5\"  Weight: 302.#  Eval Weight: 296.6#   BMI: 39.7  Wt with BMI of 25: 187#  Pre-Op Excess Wt: 109.4#  BMI to Qualify at 40 = 299#  PMH includes: Obesity, SB=4/8     Pt advised not to gain weight during preop process. Pt encouraged to lose weight via healthy eating and exercise. Pt may follow Liver Shrinking diet 2 weeks prior to DOS depending on BMI at time. This diet will promote weight loss.    Teresita Marie Equation:   Estimated calories to maintain weightt: 2900   Estimated calories for weight loss 4015-5310 ( 1-2# per wk wt loss - sedentary )  Estimated protein needs  grams (1.0-1.5 gms/kg BMI at 25 )   Estimated fluid needs 85-99 oz (30-35 ml/kg BMI at 25 )      Weight History Reason for WLS: AT current weight X 5 years and taking toll on knees and feet - cannot snowboard and ice skate Onset of Obesity: Adult  Mom had bypass  Family history of obesity: Yes  Wt Loss Attempts: Exercise  Fasting  High Protein/Low CHO diets (Keto)  Self Created Diets (Portion Control, Healthy Food Choices, etc.)  Patient has tried the above for 6 months or more with insufficient weight loss or weight regain, which is why patient has requested to be evaluated for weight loss surgery today  Maximum Wt Lost: 25-30#       Review of History and Medications   OTC: Gummies   Past Medical History:   Diagnosis Date    Eczema     Nosebleed      Past Surgical History:   Procedure Laterality Date    CAUTERIZE INNER NOSE Right 2021     Social History     Socioeconomic History    Marital status: Single     Spouse name: Not on file    Number of children: Not on file    Years of education: " Not on file    Highest education level: Not on file   Occupational History    Not on file   Tobacco Use    Smoking status: Former     Current packs/day: 0.00     Types: Cigarettes     Quit date: 12/28/2020     Years since quitting: 3.0    Smokeless tobacco: Never   Vaping Use    Vaping status: Every Day    Substances: Nicotine   Substance and Sexual Activity    Alcohol use: Yes     Comment: socially on weekends    Drug use: Not on file    Sexual activity: Not on file   Other Topics Concern    Not on file   Social History Narrative    Who lives in your home: father    What type of home do you live in: Single house    Age of your home: 50 yrs    How long have you been living there: 13 yrs    Type of heat: Baseboard and Wood stove    Type of fuel: Oil and Wood    What type of gabe is in your bedroom: Hardwood floor    Do you have the following in or near your home:    Air products: Window air conditioning    Pests: None    Pets: dogs and cats when he visits his moms but not in his home     Are pets allowed in bedroom: N/A    Open fields, wooded areas nearby: Open fields and wooded areas     Basement: Damp    Exposure to second hand smoke: No        Habits:    Caffein:     Chocolate:     Other:          Social Determinants of Health     Financial Resource Strain: Not on file   Food Insecurity: Not on file   Transportation Needs: Not on file   Physical Activity: Not on file   Stress: Not on file   Social Connections: Not on file   Intimate Partner Violence: Not on file   Housing Stability: Not on file       Current Outpatient Medications:     amphetamine-dextroamphetamine (ADDERALL XR) 20 MG 24 hr capsule, Take 20 mg by mouth daily, Disp: , Rfl:     montelukast (SINGULAIR) 10 mg tablet, Take 1 tablet (10 mg total) by mouth daily at bedtime, Disp: 30 tablet, Rfl: 5  Food Intake and Lifestyle Assessment  Kinyarwanda  Food Intake Assessment completed via usual diet recall  Breakfast: Coffee (rarely) 9:30 SW, Water  "  Dinner: 6-8 pm  Fatty meats, stews, vegetables, reduced portion   Snacks protein bar, maybe chips  Beverage intake:  Water, Zero sugar beverages Alcohol on W/E - few beers  Protein supplement: Bars - Nilke  Portions: 6 oz Protein  1/2-1 c Starch   1-2c Vegetable    Estimated protein intake per day:   Estimated fluid intake per day: 6-8 bottles in summer  4-6 bottles per day  Meals eaten away from home: Decreased Fast   Typical meal pattern: 2 meals per day and 0-1 snacks per day  Eating Behaviors: Consumption of high calorie/ high fat foods, Large portion sizes and Emotional eating but since developed positive coping skilss  Food allergies or intolerances: No Known Allergies - Lactose intolerance  Cultural or Samaritan considerations: None    Physical Assessment  Physical Activity Job is very active  Types of exercise: Plans to return to   Current physical limitations: pain    Psychosocial Assessment   Support systems: parent(s) friend(s)  Socioeconomic factors:  in [facility]    Nutrition Diagnosis  Diagnosis: Overweight / Obesity (NC-3.3)  Related to: Physical inactivity and Excessive energy intake  As Evidenced by: BMI >25     Nutrition Prescription: Recommend the following diet  Low fat, Low sugar, High protein and Regular    Interventions and Teaching   Discussed pre-op and post-op nutrition guidelines.       Patient educated and handouts provided.  Surgical changes to stomach / GI  Capacity of post-surgery stomach  Diet progression  Adequate hydration  Sugar and fat restriction to decrease \"dumping syndrome\"  Fat restriction to decrease steatorrhea  Expected weight loss  Weight loss plateaus/ possibility of weight regain  Exercise  Suggestions for pre-op diet  Nutrition considerations after surgery  Protein supplements  Meal planning and preparation  Appropriate carbohydrate, protein, and fat intake, and food/fluid choices to maximize safe weight loss, nutrient intake, and tolerance " "  Dietary and lifestyle changes  Possible problems with poor eating habits  Intuitive eating  Techniques for self monitoring and keeping daily food journal  Potential for food intolerance after surgery, and ways to deal with them including: lactose intolerance, nausea, reflux, vomiting, diarrhea, food intolerance, appetite changes, gas  Vitamin / Mineral supplementation of Multivitamin with minerals, Calcium, Vitamin B12, Iron, Fat Soluble vitamins and Vitamin D    Patient is not currently pregnant and doesn't desire to become pregnant a minimum of one year post-op    Education provided to: patient    Barriers to learning: No barriers identified    Readiness to change: preparation    Prior research on procedure: internet, discussed with provider and friends or family    Comprehension: verbalizes understanding     Expected Compliance: good  Recommendations  Pt is an appropriate candidate for surgery. Yes    Evaluation / Monitoring  Dietitian to Monitor: Eating pattern as discussed Tolerance of nutrition prescription Body weight Lab values Physical activity Bowel pattern    2/6 Weight Check Visit Summary 10/19/2023  Just started preop process. Pt working on requirements for program. Has stopped vaping since eval last week. Since has been eating sunflower seeds to help replace habit. Also to start using pre prepped meals \"Factor\" as a healthy option for meals as works long hours with travel. Questions/concerns addressed during session. Workflow reviewed and updated,  To to call to schedule November appt as trying to schedule other appts as per work schedule.  4/6 Weight Check Visit Summary 12/29/2023  Continues with pre op process. Making changes to diet via healthier choices and  eating smaller meals versus 1 large meal. Reports some discomfort eating milk products though appears could be the fat content as per other foods causing same effect. Water intake good -  stopped drinking soda. Not formally exercising as " working long hours plus 3 hours drive time but has active job. Did stop vaping - but reports he did vape sporatically this month. Advised needs to stop nicotine completely before testing. Had discussion on non surgical options as pt not completely sure he wants WLS d/t age. Questions/concerns addressed. Pt receptive       Workflow: (Incomplete in Bold):  Psych and/or D+A Clearance: N/A  Blood Work: To complete  PCP letter: To obtain  Surgeon Appt: 9/1/2023  EGD: To schedule with colonoscopy-   Cardiac Risk Assessment with ECG To schedule  Sleep Studies:Consult on  11/22/2023 - sleep study 12/29/2023  Nicotine test:  Stopped vaping - test ordered  Pre-Operative Program: 4/6       Goals  Eliminate sugar sweetened beverages, Food journal, Exercise 30 minutes 5 times per week, Complete lession plans 1-6, Eat 3 meals per day and Eliminate mindless snacking   Follow Pre-Surgery guidelines  > Trial Baritastic for food logging  > Establish regular meal pattern - include fruits, vegetables and whole grains  > Avoid skipping meals- Can use protein drink or bar as meal replacement  > Decrease portions  > Focus on protein - include lean protein at each meal and snack - Learn to eat protein first  > Continue to imit processed foods, fast foods and dining away from home  > Include meal prepping  > Limit snacks - healthier choices and portion; avoid grazing  > Slow pace of eating and sip fluids  - practice 30/60 minute rule  > Reduce caffeine/ eliminate by day of surgery  > Reduce/eliminate carbonation by pre-op diet  > Maintain water intake >  - 64 oz  > Maintain ncrease physical activity -return to gym   > Start multi vitamin and additional Vitamin D 2000IU  Work on skills to cope with emotional eating/mindfull eating  Pre-op weight loss not required but advised not to gain weight  F/U next month with bariatric provider      Time Spent:   30 minutes

## 2023-12-29 ENCOUNTER — OFFICE VISIT (OUTPATIENT)
Dept: BARIATRICS | Facility: CLINIC | Age: 23
End: 2023-12-29

## 2023-12-29 ENCOUNTER — HOSPITAL ENCOUNTER (OUTPATIENT)
Dept: SLEEP CENTER | Facility: CLINIC | Age: 23
Discharge: HOME | End: 2023-12-29
Payer: COMMERCIAL

## 2023-12-29 VITALS — BODY MASS INDEX: 40.96 KG/M2 | WEIGHT: 302 LBS

## 2023-12-29 DIAGNOSIS — E66.01 OBESITY, CLASS III, BMI 40-49.9 (MORBID OBESITY) (HCC): Primary | ICD-10-CM

## 2023-12-29 DIAGNOSIS — F90.9 ATTENTION DEFICIT HYPERACTIVITY DISORDER (ADHD), UNSPECIFIED ADHD TYPE: ICD-10-CM

## 2023-12-29 DIAGNOSIS — R06.83 SNORING: ICD-10-CM

## 2023-12-29 DIAGNOSIS — E66.01 MORBID OBESITY (HCC): ICD-10-CM

## 2023-12-29 PROCEDURE — G0399 HOME SLEEP TEST/TYPE 3 PORTA: HCPCS

## 2023-12-29 PROCEDURE — RECHECK

## 2023-12-29 NOTE — PROGRESS NOTES
Home Sleep Study Documentation    HOME STUDY DEVICE: Noxturnal no                                           Estephania G3 yes      Pre-Sleep Home Study:    Set-up and instructions performed by: Barbara    Technician performed demonstration for Patient: yes    Return demonstration performed by Patient: yes    Written instructions provided to Patient: yes    Patient signed consent form: yes        Post-Sleep Home Study:    Additional comments by Patient: N/A    Home Sleep Study Failed:no:    Failure reason: N/A    Reported or Detected: N/A    Scored by: MARY Delarosa

## 2024-01-02 PROBLEM — G47.33 OSA (OBSTRUCTIVE SLEEP APNEA): Status: ACTIVE | Noted: 2024-01-02

## 2024-01-29 ENCOUNTER — TELEPHONE (OUTPATIENT)
Dept: SLEEP CENTER | Facility: CLINIC | Age: 24
End: 2024-01-29

## 2024-01-29 NOTE — TELEPHONE ENCOUNTER
Per Dr. Polanco Home Sleep study shows mild OMER and patient is to be scheduled for follow up with Sherri to discuss treatment options     Left call back message and a MyChart message

## 2024-04-26 ENCOUNTER — TELEPHONE (OUTPATIENT)
Dept: BARIATRICS | Facility: CLINIC | Age: 24
End: 2024-04-26

## 2024-05-14 ENCOUNTER — TELEPHONE (OUTPATIENT)
Dept: BARIATRICS | Facility: CLINIC | Age: 24
End: 2024-05-14

## 2024-05-14 NOTE — TELEPHONE ENCOUNTER
Spoke with patient- due to his hectic work schedule he is wanting to hold off on surgery at this time. Patient to call back if his situation changes in the future.  
Never